# Patient Record
Sex: MALE | Race: ASIAN | Employment: FULL TIME | URBAN - METROPOLITAN AREA
[De-identification: names, ages, dates, MRNs, and addresses within clinical notes are randomized per-mention and may not be internally consistent; named-entity substitution may affect disease eponyms.]

---

## 2020-08-24 ENCOUNTER — OFFICE VISIT (OUTPATIENT)
Dept: FAMILY MEDICINE | Facility: CLINIC | Age: 55
End: 2020-08-24
Payer: COMMERCIAL

## 2020-08-24 VITALS
OXYGEN SATURATION: 99 % | BODY MASS INDEX: 29.59 KG/M2 | SYSTOLIC BLOOD PRESSURE: 149 MMHG | RESPIRATION RATE: 16 BRPM | DIASTOLIC BLOOD PRESSURE: 95 MMHG | HEIGHT: 63 IN | HEART RATE: 81 BPM | WEIGHT: 167 LBS | TEMPERATURE: 98.6 F

## 2020-08-24 DIAGNOSIS — I10 ESSENTIAL HYPERTENSION: Primary | ICD-10-CM

## 2020-08-24 DIAGNOSIS — Z00.00 ROUTINE ADULT HEALTH MAINTENANCE: ICD-10-CM

## 2020-08-24 LAB
BUN SERPL-MCNC: 16 MG/DL (ref 7–30)
CALCIUM SERPL-MCNC: 9.5 MG/DL (ref 8.5–10.4)
CHLORIDE SERPLBLD-SCNC: 99 MMOL/L (ref 94–109)
CHOLEST SERPL-MCNC: 198 MG/DL
CO2 SERPL-SCNC: 28 MMOL/L (ref 20–32)
CREAT SERPL-MCNC: 0.9 MG/DL (ref 0.8–1.5)
EGFR CALCULATED (BLACK REFERENCE): >90 ML/MIN
EGFR CALCULATED (NON BLACK REFERENCE): >90 ML/MIN
FASTING?: NORMAL
GLUCOSE SERPL-MCNC: 133 MG/DL (ref 60–109)
HDLC SERPL-MCNC: 62 MG/DL
LDLC SERPL CALC-MCNC: 114 MG/DL
POTASSIUM SERPL-SCNC: 3.7 MMOL/L (ref 3.4–5.3)
SODIUM SERPL-SCNC: 140 MMOL/L (ref 133–144)
TRIGL SERPL-MCNC: 110 MG/DL

## 2020-08-24 RX ORDER — AMLODIPINE BESYLATE 10 MG/1
10 TABLET ORAL DAILY
Qty: 30 TABLET | Refills: 4 | Status: SHIPPED | OUTPATIENT
Start: 2020-08-24 | End: 2021-01-20

## 2020-08-24 RX ORDER — LISINOPRIL 10 MG/1
10 TABLET ORAL DAILY
Qty: 30 TABLET | Refills: 3 | Status: SHIPPED | OUTPATIENT
Start: 2020-08-24 | End: 2020-12-14

## 2020-08-24 ASSESSMENT — MIFFLIN-ST. JEOR: SCORE: 1491.25

## 2020-08-24 NOTE — PROGRESS NOTES
Assessment and Plan     HTN:  /95 here in clinic today. Currently taking Amlodipine 10mg every day. Cr 1.36 at ED, so will wait on BMP for results before starting ACEI likely. No worrisome s/s.  - BMP  - Represcribed Amlodipine  - Will prescribe Lisinopril 10mg if Cr looks alright  - F/U 2-4 weeks    Healthcare Maintenance:   - Colonoscopy referral  - Lipid Panel  - Routine physical exam recommended in 1 month    Options for treatment and follow-up care were reviewed with the patient and/or guardian. Selena Quintanilla and/or guardian engaged in the decision making process and verbalized understanding of the options discussed and agreed with the final plan.    Eric Santiago DO, MBA  [facility] Residency Program, PGY-2    Precepted patient with Dr. Conklin    Preceptor Attestation:   Patient seen, evaluated and discussed with the resident. I have verified the content of the note, which accurately reflects my assessment of the patient and the plan of care.  Supervising Physician:Luis Conklin MD  [facility]         HPI:   Selena Quintanilla is a 54 year old male who presents to clinic today for   Chief Complaint   Patient presents with     Establish Care     No previous primary     ER F/U     HTN follow up.      Medication Reconciliation     Add Amlodipine 10 mg     ED Follow up:  - HTN with /122 when he went to the ED on 8/12 for elbow pain which has since resolved. Was otherwise asymptomatic, and his ECG showed mild LVH, but no other issues at that time.  - Discharged on Amlodipine 10mg every day    - Has been taking his Amlodipine w/o side effects or concerns   - No hx drinking/smoking        Denies F, CP, SOB, changes in vision/hearing/GI//diet, abdominal pain, numbness/tingling, or any other concerns.         PMHX:   Active Problems List  There is no problem list on file for this patient.      Current Medications  Current Outpatient Medications  "  Medication Sig Dispense Refill     amLODIPine (NORVASC) 10 MG tablet Take 1 tablet (10 mg) by mouth daily 30 tablet 4       Social History  Social History     Tobacco Use     Smoking status: Never Smoker     Smokeless tobacco: Never Used   Substance Use Topics     Alcohol use: Yes     Comment: Occasionally     Drug use: Never     History   Drug Use Unknown       Family History  No family history on file.    Allergies  No Known Allergies         Physical Exam:     Vitals:    08/24/20 0812 08/24/20 0816 08/24/20 0818   BP: (!) 157/101 (!) 157/92 (!) 149/95   BP Location: Right arm Left arm Right arm   Patient Position: Sitting Sitting Sitting   Cuff Size: Adult Regular Adult Regular Adult Regular   Pulse: 81     Resp: 16     Temp: 98.6  F (37  C)     TempSrc: Oral     SpO2: 99%     Weight: 75.8 kg (167 lb)     Height: 1.59 m (5' 2.6\")       Body mass index is 29.96 kg/m .    GENERAL APPEARANCE: alert, appears stated age, no acute distress  HEENT: Eyes grossly normal to inspection, nares normal  RESP: lungs clear to auscultation - no rales, rhonchi, or wheezes  CV: regular rate and rhythm, no murmur, click, rub, or gallop  MSK: extremities normal, no gross deformities noted, no lower extremity edema  SKIN: no suspicious lesions or rashes   NEURO: Normal strength and tone, sensory exam grossly normal, mentation appears intact and speech normal  PSYCH: mood and affect normal/bright     "

## 2020-08-24 NOTE — NURSING NOTE
"Chief Complaint   Patient presents with     Establish Care     No previous primary     ER F/U     HTN follow up.      Medication Reconciliation     Add Amlodipine 10 mg       BP (!) 157/101 (BP Location: Right arm, Patient Position: Sitting, Cuff Size: Adult Regular)   Pulse 81   Temp 98.6  F (37  C) (Oral)   Resp 16   Ht 1.59 m (5' 2.6\")   Wt 75.8 kg (167 lb)   SpO2 99%   BMI 29.96 kg/m       BP recheck: 149/95, right arm sitting    Left arm: 152/97    ~ Skyler Quintanilla CMA (Chrissi)  [facility]  Phone: [phone]    "

## 2020-09-08 ENCOUNTER — OFFICE VISIT (OUTPATIENT)
Dept: FAMILY MEDICINE | Facility: CLINIC | Age: 55
End: 2020-09-08
Payer: COMMERCIAL

## 2020-09-08 VITALS
TEMPERATURE: 98 F | OXYGEN SATURATION: 100 % | BODY MASS INDEX: 29.96 KG/M2 | RESPIRATION RATE: 16 BRPM | HEART RATE: 67 BPM | SYSTOLIC BLOOD PRESSURE: 115 MMHG | DIASTOLIC BLOOD PRESSURE: 79 MMHG | WEIGHT: 167 LBS

## 2020-09-08 DIAGNOSIS — I10 ESSENTIAL HYPERTENSION: Primary | ICD-10-CM

## 2020-09-08 NOTE — PROGRESS NOTES
Assessment and Plan     HTN:  /79 today. Much improved from two weeks ago when we added Lisinopril 10mg. No side effects with meds.  - Continue Lisinopril 10mg and Amlodipine 10mg   - F/U 6 months      Options for treatment and follow-up care were reviewed with the patient and/or guardian. Selena Quintanilla and/or guardian engaged in the decision making process and verbalized understanding of the options discussed and agreed with the final plan.    Eric Santiago DO, MBA  [facility], PGY-2    Precepted patient with Dr. Teresa Yeboah       HPI:   Selena Quintanilla is a 54 year old male who presents to clinic today for   Chief Complaint   Patient presents with     RECHECK     follow up B/P     HTN F/U:  - No issues taking with meds  - No side effects   - Takes at night   - Recently added Lisinopril 10mg       Denies F, CP, SOB, changes in vision/hearing/GI//diet, abdominal pain, numbness/tingling, or any other concerns.         PMHX:   Active Problems List  Patient Active Problem List   Diagnosis     Essential hypertension       Current Medications  Current Outpatient Medications   Medication Sig Dispense Refill     amLODIPine (NORVASC) 10 MG tablet Take 1 tablet (10 mg) by mouth daily 30 tablet 4     lisinopril (ZESTRIL) 10 MG tablet Take 1 tablet (10 mg) by mouth daily 30 tablet 3       Social History  Social History     Tobacco Use     Smoking status: Never Smoker     Smokeless tobacco: Never Used   Substance Use Topics     Alcohol use: Yes     Comment: Occasionally     Drug use: Never     History   Drug Use Unknown       Family History  No family history on file.    Allergies  No Known Allergies         Physical Exam:     Vitals:    09/08/20 0858   BP: 115/79   Pulse: 67   Resp: 16   Temp: 98  F (36.7  C)   TempSrc: Oral   SpO2: 100%   Weight: 75.8 kg (167 lb)     Body mass index is 29.96 kg/m .    GENERAL APPEARANCE: alert, appears stated age, no  acute distress  RESP: lungs clear to auscultation - no rales, rhonchi, or wheezes  CV: regular rate and rhythm, no murmur, click, rub, or gallop  MSK: extremities normal, no gross deformities noted, no lower extremity edema  SKIN: no suspicious lesions or rashes   NEURO: Normal strength and tone, sensory exam grossly normal, mentation appears intact and speech normal  PSYCH: mood and affect normal/bright

## 2020-09-08 NOTE — PROGRESS NOTES
Preceptor Attestation:   Patient seen, evaluated and discussed with the resident. I have verified the content of the note, which accurately reflects my assessment of the patient and the plan of care.  Supervising Physician:Teresa Yeboah MD  [facility]

## 2020-09-14 ENCOUNTER — AMBULATORY - HEALTHEAST (OUTPATIENT)
Dept: SURGERY | Facility: AMBULATORY SURGERY CENTER | Age: 55
End: 2020-09-14

## 2020-09-14 ENCOUNTER — RECORDS - HEALTHEAST (OUTPATIENT)
Dept: ADMINISTRATIVE | Facility: OTHER | Age: 55
End: 2020-09-14

## 2020-09-14 DIAGNOSIS — Z11.59 ENCOUNTER FOR SCREENING FOR OTHER VIRAL DISEASES: ICD-10-CM

## 2020-10-01 DIAGNOSIS — Z01.818 PRE-OP EXAM: Primary | ICD-10-CM

## 2020-10-01 NOTE — PROGRESS NOTES
1. Pre-op exam  - Asymptomatic COVID-19 Virus (Coronavirus) by PCR; Future      Charlie Oneill III, MD, FAAFP  [facility] Res[facility] Faculty  10/01/20 4:18 PM

## 2020-10-07 ENCOUNTER — TELEPHONE (OUTPATIENT)
Dept: FAMILY MEDICINE | Facility: CLINIC | Age: 55
End: 2020-10-07

## 2020-12-14 DIAGNOSIS — I10 ESSENTIAL HYPERTENSION: ICD-10-CM

## 2020-12-14 RX ORDER — LISINOPRIL 10 MG/1
10 TABLET ORAL DAILY
Qty: 30 TABLET | Refills: 4 | Status: SHIPPED | OUTPATIENT
Start: 2020-12-14 | End: 2021-02-01

## 2021-01-20 DIAGNOSIS — I10 ESSENTIAL HYPERTENSION: ICD-10-CM

## 2021-01-20 RX ORDER — AMLODIPINE BESYLATE 10 MG/1
10 TABLET ORAL DAILY
Qty: 30 TABLET | Refills: 4 | Status: SHIPPED | OUTPATIENT
Start: 2021-01-20 | End: 2021-08-24

## 2021-02-01 DIAGNOSIS — I10 ESSENTIAL HYPERTENSION: ICD-10-CM

## 2021-02-01 RX ORDER — LISINOPRIL 10 MG/1
10 TABLET ORAL DAILY
Qty: 30 TABLET | Refills: 4 | Status: SHIPPED | OUTPATIENT
Start: 2021-02-01 | End: 2021-10-11

## 2021-02-01 NOTE — TELEPHONE ENCOUNTER
Message to physician:     Date of last visit: 9/8/20    Date of next visit if scheduled: none    Last Comprehensive Metabolic Panel:  Sodium   Date Value Ref Range Status   08/24/2020 140.0 133.0 - 144.0 mmol/L Final     Potassium   Date Value Ref Range Status   08/24/2020 3.7 3.4 - 5.3 mmol/L Final     Chloride   Date Value Ref Range Status   08/24/2020 99.0 94.0 - 109.0 mmol/L Final     Carbon Dioxide   Date Value Ref Range Status   08/24/2020 28.0 20.0 - 32.0 mmol/L Final     Glucose   Date Value Ref Range Status   08/24/2020 133.0 (H) 60.0 - 109.0 mg/dL Final     Urea Nitrogen   Date Value Ref Range Status   08/24/2020 16.0 7.0 - 30.0 mg/dL Final     Creatinine   Date Value Ref Range Status   08/24/2020 0.9 0.8 - 1.5 mg/dL Final     Calcium   Date Value Ref Range Status   08/24/2020 9.5 8.5 - 10.4 mg/dL Final       BP Readings from Last 3 Encounters:   09/08/20 115/79   08/24/20 (!) 149/95       No results found for: A1C             Please complete refill and CLOSE ENCOUNTER.  Closing the encounter signifies the refill is complete.

## 2021-08-24 DIAGNOSIS — I10 ESSENTIAL HYPERTENSION: ICD-10-CM

## 2021-08-24 RX ORDER — AMLODIPINE BESYLATE 10 MG/1
10 TABLET ORAL DAILY
Qty: 30 TABLET | Refills: 11 | Status: SHIPPED | OUTPATIENT
Start: 2021-08-24 | End: 2022-07-20

## 2021-10-11 DIAGNOSIS — I10 ESSENTIAL HYPERTENSION: ICD-10-CM

## 2021-10-11 RX ORDER — LISINOPRIL 10 MG/1
10 TABLET ORAL DAILY
Qty: 30 TABLET | Refills: 11 | Status: SHIPPED | OUTPATIENT
Start: 2021-10-11 | End: 2022-07-31

## 2021-10-26 ENCOUNTER — OFFICE VISIT (OUTPATIENT)
Dept: FAMILY MEDICINE | Facility: CLINIC | Age: 56
End: 2021-10-26
Payer: COMMERCIAL

## 2021-10-26 VITALS
HEART RATE: 75 BPM | RESPIRATION RATE: 16 BRPM | BODY MASS INDEX: 30.3 KG/M2 | SYSTOLIC BLOOD PRESSURE: 114 MMHG | DIASTOLIC BLOOD PRESSURE: 75 MMHG | OXYGEN SATURATION: 97 % | WEIGHT: 171 LBS | TEMPERATURE: 97.4 F | HEIGHT: 63 IN

## 2021-10-26 DIAGNOSIS — I10 ESSENTIAL HYPERTENSION: Primary | ICD-10-CM

## 2021-10-26 DIAGNOSIS — Z00.00 WELLNESS EXAMINATION: ICD-10-CM

## 2021-10-26 LAB
ANION GAP SERPL CALCULATED.3IONS-SCNC: 13 MMOL/L (ref 5–18)
BUN SERPL-MCNC: 20 MG/DL (ref 8–22)
CALCIUM SERPL-MCNC: 10.1 MG/DL (ref 8.5–10.5)
CHLORIDE BLD-SCNC: 105 MMOL/L (ref 98–107)
CO2 SERPL-SCNC: 20 MMOL/L (ref 22–31)
CREAT SERPL-MCNC: 1.35 MG/DL (ref 0.7–1.3)
GFR SERPL CREATININE-BSD FRML MDRD: 59 ML/MIN/1.73M2
GLUCOSE BLD-MCNC: 124 MG/DL (ref 70–125)
HIV 1+2 AB+HIV1 P24 AG SERPL QL IA: NEGATIVE
POTASSIUM BLD-SCNC: 3.6 MMOL/L (ref 3.5–5)
SODIUM SERPL-SCNC: 138 MMOL/L (ref 136–145)

## 2021-10-26 PROCEDURE — 36415 COLL VENOUS BLD VENIPUNCTURE: CPT | Performed by: STUDENT IN AN ORGANIZED HEALTH CARE EDUCATION/TRAINING PROGRAM

## 2021-10-26 PROCEDURE — 87389 HIV-1 AG W/HIV-1&-2 AB AG IA: CPT | Performed by: STUDENT IN AN ORGANIZED HEALTH CARE EDUCATION/TRAINING PROGRAM

## 2021-10-26 PROCEDURE — 86803 HEPATITIS C AB TEST: CPT | Performed by: STUDENT IN AN ORGANIZED HEALTH CARE EDUCATION/TRAINING PROGRAM

## 2021-10-26 PROCEDURE — 99213 OFFICE O/P EST LOW 20 MIN: CPT | Mod: GC | Performed by: STUDENT IN AN ORGANIZED HEALTH CARE EDUCATION/TRAINING PROGRAM

## 2021-10-26 PROCEDURE — 80048 BASIC METABOLIC PNL TOTAL CA: CPT | Performed by: STUDENT IN AN ORGANIZED HEALTH CARE EDUCATION/TRAINING PROGRAM

## 2021-10-26 ASSESSMENT — MIFFLIN-ST. JEOR: SCORE: 1511.9

## 2021-10-26 NOTE — PROGRESS NOTES
Assessment and Plan   Selena Quintanilla is a 56 year old male who presents to the clinic for a blood pressure follow up and medication management. His blood pressure is well controlled on the 10 mg amlodipine, and 10 mg lisinopril.    Hypertension:  /75 here today. No side effects or issues taking meds.   - Amlodipine and Lisinopril refilled  - BMP ordered  - Follow up in 1 year for medication management    Health Maintenance:  - Follow up soon for annual physical  - HIV, Hep C ordered  - Colonoscopy Ordered.       Options for treatment and follow-up care were reviewed with the patient and/or guardian. Selena Quintanilla and/or guardian engaged in the decision making process and verbalized understanding of the options discussed and agreed with the final plan.    Eric Santiago DO, MBA  [facility], PGY-3    Precepted patient with Dr. Mar Mae       HPI:   Selena Quintanilla is a 56 year old male who presents to clinic today for   Chief Complaint   Patient presents with     Follow Up     BP     Medication Reconciliation     completed      Selena Quintanilla is a 56 year old man who presents for a blood pressure follow up and medication management. Patient reports that he takes his 10 mg of lisinopril and 10 mg of amlodipine daily. He almost always takes the medicines, but notes that 1-2 times a month he may forget to take the medication during the day. He denies any chest pain, shortness of breath, palpitations, headaches, or vision changes. He also denies any dizziness on standing up from a seated patient. Patient denies any side effects from his medications.   Of note, he has not changed his lifestyle over the past couple of months, but [place]     Patient would like to get more information on a colonoscopy, and is open to having that performed.     Denies Fever, Chest Pain, shortness of breath , changes in  "vision/hearing/GI//diet, abdominal pain, numbness/tingling, or any other concerns.         PMHX:   Active Problems List  Patient Active Problem List   Diagnosis     Essential hypertension       Current Medications  Current Outpatient Medications   Medication Sig Dispense Refill     amLODIPine (NORVASC) 10 MG tablet Take 1 tablet (10 mg) by mouth daily 30 tablet 11     lisinopril (ZESTRIL) 10 MG tablet Take 1 tablet (10 mg) by mouth daily 30 tablet 11       Social History  Social History     Tobacco Use     Smoking status: Never Smoker     Smokeless tobacco: Never Used   Substance Use Topics     Alcohol use: Yes     Comment: Occasionally     Drug use: Never     History   Drug Use Unknown       Family History  History reviewed. No pertinent family history.    Allergies  No Known Allergies         Physical Exam:     Vitals:    10/26/21 1454   BP: 114/75   Pulse: 75   Resp: 16   Temp: 97.4  F (36.3  C)   TempSrc: Oral   SpO2: 97%   Weight: 77.6 kg (171 lb)   Height: 1.61 m (5' 3.39\")     Body mass index is 29.92 kg/m .    GENERAL APPEARANCE: alert, appears stated age, no acute distress  RESP: lungs clear to auscultation - no rales, rhonchi, or wheezes  CV: regular rate and rhythm, no murmur, click, rub, or gallop  PSYCH: mood and affect normal/bright       "

## 2021-10-27 LAB — HCV AB SERPL QL IA: NEGATIVE

## 2021-10-30 NOTE — PROGRESS NOTES
Preceptor Attestation:  Patient's case reviewed and discussed with the resident, Isauro Santiago MD, and I personally evaluated the patient. I agree with written assessment and plan of care.    Supervising Physician:  Mar Mae MD   [facility]

## 2021-12-02 ENCOUNTER — IMMUNIZATION (OUTPATIENT)
Dept: FAMILY MEDICINE | Facility: CLINIC | Age: 56
End: 2021-12-02
Payer: COMMERCIAL

## 2021-12-02 PROCEDURE — 91306 COVID-19,PF,MODERNA (18+ YRS BOOSTER .25ML): CPT

## 2021-12-02 PROCEDURE — 0064A COVID-19,PF,MODERNA (18+ YRS BOOSTER .25ML): CPT

## 2022-07-20 DIAGNOSIS — I10 ESSENTIAL HYPERTENSION: ICD-10-CM

## 2022-07-20 RX ORDER — AMLODIPINE BESYLATE 10 MG/1
10 TABLET ORAL DAILY
Qty: 60 TABLET | Refills: 0 | Status: SHIPPED | OUTPATIENT
Start: 2022-07-20 | End: 2022-11-30

## 2022-07-29 DIAGNOSIS — I10 ESSENTIAL HYPERTENSION: ICD-10-CM

## 2022-07-31 RX ORDER — LISINOPRIL 10 MG/1
10 TABLET ORAL DAILY
Qty: 30 TABLET | Refills: 11 | Status: SHIPPED | OUTPATIENT
Start: 2022-07-31 | End: 2024-02-16

## 2022-10-06 ENCOUNTER — IMMUNIZATION (OUTPATIENT)
Dept: FAMILY MEDICINE | Facility: CLINIC | Age: 57
End: 2022-10-06
Payer: COMMERCIAL

## 2022-10-06 PROCEDURE — 99207 PR NO CHARGE LOS: CPT

## 2022-10-06 PROCEDURE — 91313 COVID-19,PF,MODERNA BIVALENT: CPT

## 2022-10-06 PROCEDURE — 0134A COVID-19,PF,MODERNA BIVALENT: CPT

## 2022-11-30 ENCOUNTER — OFFICE VISIT (OUTPATIENT)
Dept: FAMILY MEDICINE | Facility: CLINIC | Age: 57
End: 2022-11-30
Payer: COMMERCIAL

## 2022-11-30 VITALS
DIASTOLIC BLOOD PRESSURE: 122 MMHG | BODY MASS INDEX: 29.22 KG/M2 | RESPIRATION RATE: 16 BRPM | TEMPERATURE: 98.1 F | SYSTOLIC BLOOD PRESSURE: 158 MMHG | WEIGHT: 167 LBS | OXYGEN SATURATION: 99 % | HEART RATE: 75 BPM

## 2022-11-30 DIAGNOSIS — Z23 NEED FOR PROPHYLACTIC VACCINATION AND INOCULATION AGAINST INFLUENZA: ICD-10-CM

## 2022-11-30 DIAGNOSIS — I10 ESSENTIAL HYPERTENSION: ICD-10-CM

## 2022-11-30 DIAGNOSIS — Z71.84 COUNSELING FOR TRAVEL: ICD-10-CM

## 2022-11-30 DIAGNOSIS — N18.2 CKD (CHRONIC KIDNEY DISEASE) STAGE 2, GFR 60-89 ML/MIN: ICD-10-CM

## 2022-11-30 DIAGNOSIS — I10 ESSENTIAL HYPERTENSION: Primary | ICD-10-CM

## 2022-11-30 DIAGNOSIS — Z29.89 NEED FOR MALARIA PROPHYLAXIS: ICD-10-CM

## 2022-11-30 LAB
ANION GAP SERPL CALCULATED.3IONS-SCNC: 5 MMOL/L (ref 3–14)
BUN SERPL-MCNC: 17 MG/DL (ref 7–30)
CALCIUM SERPL-MCNC: 9.2 MG/DL (ref 8.5–10.1)
CHLORIDE BLD-SCNC: 106 MMOL/L (ref 94–109)
CO2 SERPL-SCNC: 25 MMOL/L (ref 20–32)
CREAT SERPL-MCNC: 1.2 MG/DL (ref 0.66–1.25)
GFR SERPL CREATININE-BSD FRML MDRD: 71 ML/MIN/1.73M2
GLUCOSE BLD-MCNC: 144 MG/DL (ref 70–99)
POTASSIUM BLD-SCNC: 4.3 MMOL/L (ref 3.4–5.3)
SODIUM SERPL-SCNC: 136 MMOL/L (ref 133–144)

## 2022-11-30 PROCEDURE — 80048 BASIC METABOLIC PNL TOTAL CA: CPT | Performed by: FAMILY MEDICINE

## 2022-11-30 PROCEDURE — 36415 COLL VENOUS BLD VENIPUNCTURE: CPT | Performed by: FAMILY MEDICINE

## 2022-11-30 PROCEDURE — 90471 IMMUNIZATION ADMIN: CPT | Performed by: FAMILY MEDICINE

## 2022-11-30 PROCEDURE — 90682 RIV4 VACC RECOMBINANT DNA IM: CPT | Performed by: FAMILY MEDICINE

## 2022-11-30 PROCEDURE — 99214 OFFICE O/P EST MOD 30 MIN: CPT | Mod: 25 | Performed by: FAMILY MEDICINE

## 2022-11-30 RX ORDER — ATOVAQUONE AND PROGUANIL HYDROCHLORIDE 250; 100 MG/1; MG/1
1 TABLET, FILM COATED ORAL DAILY
Qty: 40 TABLET | Refills: 0 | Status: SHIPPED | OUTPATIENT
Start: 2022-11-30 | End: 2024-03-15

## 2022-11-30 RX ORDER — AMLODIPINE BESYLATE 10 MG/1
10 TABLET ORAL DAILY
Qty: 60 TABLET | Refills: 0 | Status: SHIPPED | OUTPATIENT
Start: 2022-11-30 | End: 2022-11-30

## 2022-11-30 NOTE — LETTER
December 1, 2022      Selena Quintanilla  [address] [place] [address]        Tiffani Walters   Your labs returned with normal levels of blood salts. Your kidney function is a little decreased, so it is important to control your blood pressure. We should check for diabetes also when you return.     Thank you   Benjamin Rosenstein, MD, MA       Resulted Orders   Basic metabolic panel   Result Value Ref Range    Sodium 136 133 - 144 mmol/L    Potassium 4.3 3.4 - 5.3 mmol/L    Chloride 106 94 - 109 mmol/L    Carbon Dioxide (CO2) 25 20 - 32 mmol/L    Anion Gap 5 3 - 14 mmol/L    Urea Nitrogen 17 7 - 30 mg/dL    Creatinine 1.20 0.66 - 1.25 mg/dL    Calcium 9.2 8.5 - 10.1 mg/dL    Glucose 144 (H) 70 - 99 mg/dL    GFR Estimate 71 >60 mL/min/1.73m2      Comment:      Effective December 21, 2021 eGFRcr in adults is calculated using the 2021 CKD-EPI creatinine equation which includes age and gender (Edgar et al., NEJM, DOI: 10.1056/OYZFgr8343334)       If you have any questions or concerns, please call the clinic at the number listed above.       Sincerely,      Benjamin Rosenstein, MD

## 2022-11-30 NOTE — RESULT ENCOUNTER NOTE
Please send letter with results and the following:    Tiffani Walters   Your labs returned with normal levels of blood salts. Your kidney function is a little decreased, so it is important to control your blood pressure. We should check for diabetes also when you return.     Thank you  Benjamin Rosenstein, MD, MA

## 2022-12-01 ENCOUNTER — TELEPHONE (OUTPATIENT)
Dept: FAMILY MEDICINE | Facility: CLINIC | Age: 57
End: 2022-12-01

## 2022-12-06 RX ORDER — AMLODIPINE BESYLATE 10 MG/1
TABLET ORAL
Qty: 90 TABLET | Refills: 3 | Status: SHIPPED | OUTPATIENT
Start: 2022-12-06 | End: 2024-02-05

## 2024-02-05 DIAGNOSIS — I10 ESSENTIAL HYPERTENSION: ICD-10-CM

## 2024-02-05 RX ORDER — AMLODIPINE BESYLATE 10 MG/1
10 TABLET ORAL DAILY
Qty: 30 TABLET | Refills: 0 | Status: SHIPPED | OUTPATIENT
Start: 2024-02-05 | End: 2024-02-16

## 2024-02-16 ENCOUNTER — OFFICE VISIT (OUTPATIENT)
Dept: FAMILY MEDICINE | Facility: CLINIC | Age: 59
End: 2024-02-16
Payer: COMMERCIAL

## 2024-02-16 VITALS
DIASTOLIC BLOOD PRESSURE: 99 MMHG | OXYGEN SATURATION: 97 % | BODY MASS INDEX: 30.12 KG/M2 | SYSTOLIC BLOOD PRESSURE: 155 MMHG | RESPIRATION RATE: 12 BRPM | HEART RATE: 66 BPM | TEMPERATURE: 98 F | HEIGHT: 63 IN | WEIGHT: 170 LBS

## 2024-02-16 DIAGNOSIS — I10 ESSENTIAL HYPERTENSION: Primary | ICD-10-CM

## 2024-02-16 PROCEDURE — 80048 BASIC METABOLIC PNL TOTAL CA: CPT

## 2024-02-16 PROCEDURE — 99214 OFFICE O/P EST MOD 30 MIN: CPT | Mod: GC

## 2024-02-16 PROCEDURE — 36415 COLL VENOUS BLD VENIPUNCTURE: CPT

## 2024-02-16 RX ORDER — AMLODIPINE BESYLATE 10 MG/1
10 TABLET ORAL DAILY
Qty: 30 TABLET | Refills: 0 | Status: SHIPPED | OUTPATIENT
Start: 2024-02-16

## 2024-02-16 RX ORDER — LISINOPRIL 10 MG/1
10 TABLET ORAL DAILY
Qty: 30 TABLET | Refills: 11 | Status: SHIPPED | OUTPATIENT
Start: 2024-02-16

## 2024-02-16 NOTE — PROGRESS NOTES
"  Assessment & Plan     Essential hypertension  Selena is here to follow up on hypertension, was noted to be above goal range at 11/30 visit w/ Dr. Rosenstein, though had not been taking his amlodipine at that time. Patient once again above goal range on repeat checks in clinic today (155/99, 158/122). Not have any chest pain, sob, HA, vision changes at this time, maybe feeling slightly fatigued as he does when his bp is high. Will go ahead and start on Lisinopril 10 mg in addition to max dose amlodipine, plan to follow up in clinic for bmp check and to assess response to this in 2-4 weeks.   - lisinopril (ZESTRIL) 10 MG tablet  Dispense: 30 tablet; Refill: 11  - amLODIPine (NORVASC) 10 MG tablet  Dispense: 30 tablet; Refill: 0  - Basic metabolic panel        Care gaps: yearly physical, hep B, CRC screen, DTAP, covid, flu, zoster        Return in about 4 weeks (around 3/15/2024) for follow up BP.    Subjective   Selena is a 58 year old, presenting for the following health issues:  Hypertension    HPI     HTN follow up  - BP elevated at last visit 11/30/22 w/ Dr. Rosenstein, though had not been taking his PTA amlodipine at that time  - Has not been missing doses of amlodipine recently  - No home cuff, does not check bp's at home  - Denies c/p, SOB, HA, vision changes    Review of Systems  Constitutional, HEENT, cardiovascular, pulmonary, gi and gu systems are negative, except as otherwise noted.      Objective    BP (!) 155/99   Pulse 66   Temp 98  F (36.7  C) (Oral)   Resp 12   Ht 1.595 m (5' 2.8\")   Wt 77.1 kg (170 lb)   SpO2 97%   BMI 30.31 kg/m    Body mass index is 30.31 kg/m .    Physical Exam   GENERAL: alert and no distress  NECK: no adenopathy, no asymmetry, masses, or scars  RESP: lungs clear to auscultation - no rales, rhonchi or wheezes  CV: regular rate and rhythm, normal S1 S2, no S3 or S4, no murmur, click or rub, no peripheral edema  ABDOMEN: soft, nontender, no hepatosplenomegaly, no masses and " bowel sounds normal  MS: no gross musculoskeletal defects noted, no edema    Results for orders placed or performed in visit on 02/16/24   Basic metabolic panel     Status: Abnormal   Result Value Ref Range    Sodium 142 135 - 145 mmol/L    Potassium 4.2 3.4 - 5.3 mmol/L    Chloride 107 98 - 107 mmol/L    Carbon Dioxide (CO2) 24 22 - 29 mmol/L    Anion Gap 11 7 - 15 mmol/L    Urea Nitrogen 18.3 6.0 - 20.0 mg/dL    Creatinine 1.53 (H) 0.67 - 1.17 mg/dL    GFR Estimate 53 (L) >60 mL/min/1.73m2    Calcium 9.0 8.6 - 10.0 mg/dL    Glucose 98 70 - 99 mg/dL           Signed Electronically by: Jw Sykes MD

## 2024-02-16 NOTE — PROGRESS NOTES
Preceptor Attestation:   Patient seen, evaluated and discussed with the resident Dr. Jw Sykes. I have verified the content of the note, which accurately reflects my assessment of the patient and the plan of care.    Supervising Physician:  Benjamin Rosenstein, MD, MA  [facility]  [facility]

## 2024-02-17 LAB
ANION GAP SERPL CALCULATED.3IONS-SCNC: 11 MMOL/L (ref 7–15)
BUN SERPL-MCNC: 18.3 MG/DL (ref 6–20)
CALCIUM SERPL-MCNC: 9 MG/DL (ref 8.6–10)
CHLORIDE SERPL-SCNC: 107 MMOL/L (ref 98–107)
CREAT SERPL-MCNC: 1.53 MG/DL (ref 0.67–1.17)
DEPRECATED HCO3 PLAS-SCNC: 24 MMOL/L (ref 22–29)
EGFRCR SERPLBLD CKD-EPI 2021: 53 ML/MIN/1.73M2
GLUCOSE SERPL-MCNC: 98 MG/DL (ref 70–99)
POTASSIUM SERPL-SCNC: 4.2 MMOL/L (ref 3.4–5.3)
SODIUM SERPL-SCNC: 142 MMOL/L (ref 135–145)

## 2024-02-27 ENCOUNTER — TELEPHONE (OUTPATIENT)
Dept: FAMILY MEDICINE | Facility: CLINIC | Age: 59
End: 2024-02-27
Payer: COMMERCIAL

## 2024-02-27 NOTE — TELEPHONE ENCOUNTER
Lvm for patient to call back and go over lab results. Patient has appointment scheduled for 3/15 with Onel, if he can move that any earlier that would be great but if not then keep original appointment. See Onel note

## 2024-02-27 NOTE — TELEPHONE ENCOUNTER
----- Message from Jw Sykes MD sent at 2/26/2024  9:35 AM CST -----  Creat elevated to 1.53 from baseline of ~1.20. Did start lisinopril 10d ago, will need to follow up on creat level.    Could we please call Selena and make sure that he scheduled follow up for 1-2 weeks. I see this has not been done yet.

## 2024-03-15 ENCOUNTER — OFFICE VISIT (OUTPATIENT)
Dept: FAMILY MEDICINE | Facility: CLINIC | Age: 59
End: 2024-03-15
Payer: COMMERCIAL

## 2024-03-15 VITALS
DIASTOLIC BLOOD PRESSURE: 75 MMHG | SYSTOLIC BLOOD PRESSURE: 113 MMHG | BODY MASS INDEX: 29.77 KG/M2 | TEMPERATURE: 97.9 F | OXYGEN SATURATION: 96 % | HEART RATE: 71 BPM | WEIGHT: 168 LBS | RESPIRATION RATE: 20 BRPM | HEIGHT: 63 IN

## 2024-03-15 DIAGNOSIS — Z23 ENCOUNTER FOR IMMUNIZATION: ICD-10-CM

## 2024-03-15 DIAGNOSIS — I10 ESSENTIAL HYPERTENSION: Primary | ICD-10-CM

## 2024-03-15 PROCEDURE — 90715 TDAP VACCINE 7 YRS/> IM: CPT

## 2024-03-15 PROCEDURE — 80048 BASIC METABOLIC PNL TOTAL CA: CPT

## 2024-03-15 PROCEDURE — 99213 OFFICE O/P EST LOW 20 MIN: CPT | Mod: 25

## 2024-03-15 PROCEDURE — 36415 COLL VENOUS BLD VENIPUNCTURE: CPT

## 2024-03-15 PROCEDURE — 90471 IMMUNIZATION ADMIN: CPT

## 2024-03-15 NOTE — PROGRESS NOTES
Preceptor Attestation:  Patient's case reviewed and discussed with the resident, Jw Sykes MD, and I personally evaluated the patient. I agree with written assessment and plan of care.    Supervising Physician:  Mar Mae MD   [facility]

## 2024-03-15 NOTE — PROGRESS NOTES
Prior to immunization administration, verified patients identity using patient s name and date of birth. Please see Immunization Activity for additional information.     Screening Questionnaire for Adult Immunization    Are you sick today?   No   Do you have allergies to medications, food, a vaccine component or latex?   No   Have you ever had a serious reaction after receiving a vaccination?   No   Do you have a long-term health problem with heart, lung, kidney, or metabolic disease (e.g., diabetes), asthma, a blood disorder, no spleen, complement component deficiency, a cochlear implant, or a spinal fluid leak?  Are you on long-term aspirin therapy?   No   Do you have cancer, leukemia, HIV/AIDS, or any other immune system problem?   No   Do you have a parent, brother, or sister with an immune system problem?   No   In the past 3 months, have you taken medications that affect  your immune system, such as prednisone, other steroids, or anticancer drugs; drugs for the treatment of rheumatoid arthritis, Crohn s disease, or psoriasis; or have you had radiation treatments?   No   Have you had a seizure, or a brain or other nervous system problem?   No   During the past year, have you received a transfusion of blood or blood    products, or been given immune (gamma) globulin or antiviral drug?   No   For women: Are you pregnant or is there a chance you could become       pregnant during the next month?   N/a   Have you received any vaccinations in the past 4 weeks?   No     Immunization questionnaire answers were all negative.      Patient instructed to remain in clinic for 15 minutes afterwards, and to report any adverse reactions.     Screening performed by Dottie White on 3/15/2024 at 4:27 PM.

## 2024-03-15 NOTE — PROGRESS NOTES
"  Assessment & Plan     Essential hypertension  Patient here for follow up of htn, recently added Lisinopril 10 mg daily last month in addition to his max dose amlodipine he has been on previously. Denies any issues w/ taking this or side effect of this new medication. Will check a bmp to assess renal function and electrolytes, otherwise no change to regimen. Of note, Selena is due for yearly physical and will have him back in 2-4 weeks for this.  - Basic metabolic panel    Encounter for immunization   TDAP 10-64Y (ADACEL,BOOSTRIX) [AOD844]    BMI  Estimated body mass index is 29.4 kg/m  as calculated from the following:    Height as of this encounter: 1.61 m (5' 3.39\").    Weight as of this encounter: 76.2 kg (168 lb).     No follow-ups on file.    Subjective   Selena is a 58 year old, presenting for the following health issues:  Hypertension    HPI     BP follow up  - Saw myself 2/16 for follow up on htn. Was still above goal despite adherence to meds. Added lisinopril 10 mg in addition to max dose amlodipine   - No issues taking this daily since that time, no side effects  - Denies HA, vision changes, numbness, weakness, c/p, SOB    Per my note 2/16:  Selena is here to follow up on hypertension, was noted to be above goal range at 11/30 visit w/ Dr. Rosenstein, though had not been taking his amlodipine at that time. Patient once again above goal range on repeat checks in clinic today (155/99, 158/122). Not have any chest pain, sob, HA, vision changes at this time, maybe feeling slightly fatigued as he does when his bp is high. Will go ahead and start on Lisinopril 10 mg in addition to max dose amlodipine, plan to follow up in clinic for bmp check and to assess response to this in 2-4 weeks.      Review of Systems  Constitutional, HEENT, cardiovascular, pulmonary, gi and gu systems are negative, except as otherwise noted.      Objective    /75   Pulse 71   Temp 97.9  F (36.6  C) (Oral)   Resp 20   Ht 1.61 m (5' " "3.39\")   Wt 76.2 kg (168 lb)   SpO2 96%   BMI 29.40 kg/m    Body mass index is 29.4 kg/m .  Physical Exam   GENERAL: alert and no distress  RESP: lungs clear to auscultation - no rales, rhonchi or wheezes  CV: regular rate and rhythm, normal S1 S2, no S3 or S4, no murmur, click or rub, no peripheral edema  ABDOMEN: soft, nontender, no hepatosplenomegaly, no masses and bowel sounds normal  MS: no gross musculoskeletal defects noted, no edema  Psych: A&O x3, mood is appropriate, linear thought processes intact    Results for orders placed or performed in visit on 03/15/24   Basic metabolic panel     Status: Abnormal   Result Value Ref Range    Sodium 139 135 - 145 mmol/L    Potassium 4.2 3.4 - 5.3 mmol/L    Chloride 105 98 - 107 mmol/L    Carbon Dioxide (CO2) 23 22 - 29 mmol/L    Anion Gap 11 7 - 15 mmol/L    Urea Nitrogen 20.5 (H) 6.0 - 20.0 mg/dL    Creatinine 1.19 (H) 0.67 - 1.17 mg/dL    GFR Estimate 71 >60 mL/min/1.73m2    Calcium 9.6 8.6 - 10.0 mg/dL    Glucose 99 70 - 99 mg/dL           Signed Electronically by: Jw Sykes MD    "

## 2024-03-16 LAB
ANION GAP SERPL CALCULATED.3IONS-SCNC: 11 MMOL/L (ref 7–15)
BUN SERPL-MCNC: 20.5 MG/DL (ref 6–20)
CALCIUM SERPL-MCNC: 9.6 MG/DL (ref 8.6–10)
CHLORIDE SERPL-SCNC: 105 MMOL/L (ref 98–107)
CREAT SERPL-MCNC: 1.19 MG/DL (ref 0.67–1.17)
DEPRECATED HCO3 PLAS-SCNC: 23 MMOL/L (ref 22–29)
EGFRCR SERPLBLD CKD-EPI 2021: 71 ML/MIN/1.73M2
GLUCOSE SERPL-MCNC: 99 MG/DL (ref 70–99)
POTASSIUM SERPL-SCNC: 4.2 MMOL/L (ref 3.4–5.3)
SODIUM SERPL-SCNC: 139 MMOL/L (ref 135–145)

## 2025-03-11 DIAGNOSIS — I10 ESSENTIAL HYPERTENSION: ICD-10-CM

## 2025-03-11 RX ORDER — AMLODIPINE BESYLATE 10 MG/1
10 TABLET ORAL DAILY
Qty: 30 TABLET | Refills: 0 | Status: SHIPPED | OUTPATIENT
Start: 2025-03-11

## 2025-03-11 RX ORDER — LISINOPRIL 10 MG/1
10 TABLET ORAL DAILY
Qty: 30 TABLET | Refills: 11 | Status: SHIPPED | OUTPATIENT
Start: 2025-03-11

## 2025-03-12 ENCOUNTER — TELEPHONE (OUTPATIENT)
Dept: FAMILY MEDICINE | Facility: CLINIC | Age: 60
End: 2025-03-12
Payer: COMMERCIAL

## 2025-03-12 NOTE — TELEPHONE ENCOUNTER
Reason for Call:  Appointment Request, left message     Requested provider: Angel Adams Honoree, MD [facility]  Hi team,    Please call Selena to have him come in for a blood pressure follow-up. His medications have been refilled, but it's been almost a year since he has been seen. We will need to check and see how his medications are working and do blood work to check on his kidney function.           Call taken on 3/12/2025 at 9:54 AM by Perla Vilchis

## 2025-04-14 ENCOUNTER — TELEPHONE (OUTPATIENT)
Dept: FAMILY MEDICINE | Facility: CLINIC | Age: 60
End: 2025-04-14
Payer: COMMERCIAL

## 2025-04-14 DIAGNOSIS — I10 ESSENTIAL HYPERTENSION: ICD-10-CM

## 2025-04-14 RX ORDER — AMLODIPINE BESYLATE 10 MG/1
10 TABLET ORAL DAILY
Qty: 30 TABLET | Refills: 0 | Status: SHIPPED | OUTPATIENT
Start: 2025-04-14

## 2025-04-14 NOTE — TELEPHONE ENCOUNTER
"Message to physician:     Date of last visit: 3/15/2024    Date of next visit if scheduled: NONE    Potassium   Date Value Ref Range Status   03/15/2024 4.2 3.4 - 5.3 mmol/L Final   11/30/2022 4.3 3.4 - 5.3 mmol/L Final   08/24/2020 3.7 3.4 - 5.3 mmol/L Final     Creatinine   Date Value Ref Range Status   03/15/2024 1.19 (H) 0.67 - 1.17 mg/dL Final   08/24/2020 0.9 0.8 - 1.5 mg/dL Final     GFR Estimate   Date Value Ref Range Status   03/15/2024 71 >60 mL/min/1.73m2 Final   08/12/2020 55 (L) >60 mL/min/1.73m2 Final       BP Readings from Last 3 Encounters:   03/15/24 113/75   02/16/24 (!) 155/99   11/30/22 (!) 158/122       No results found for: \"A1C\"    Please complete refill and CLOSE ENCOUNTER.  Closing the encounter signifies the refill is complete.   "

## 2025-04-14 NOTE — TELEPHONE ENCOUNTER
Per MD, called patient to see if they wanted to schedule a follow-up regarding BP and kidney functions. Unable to lvm.

## 2025-06-28 DIAGNOSIS — I10 ESSENTIAL HYPERTENSION: ICD-10-CM

## 2025-06-30 RX ORDER — AMLODIPINE BESYLATE 10 MG/1
10 TABLET ORAL DAILY
Qty: 30 TABLET | Refills: 0 | Status: SHIPPED | OUTPATIENT
Start: 2025-06-30

## 2025-06-30 NOTE — TELEPHONE ENCOUNTER
No GFR on file in the last 12 months, outside RN standing orders. Routing to PCP for review and fill. Routing to colored team for reach out to patient to schedule annual physical. Tima MARKHAM